# Patient Record
Sex: MALE | Race: WHITE | NOT HISPANIC OR LATINO | Employment: FULL TIME | URBAN - METROPOLITAN AREA
[De-identification: names, ages, dates, MRNs, and addresses within clinical notes are randomized per-mention and may not be internally consistent; named-entity substitution may affect disease eponyms.]

---

## 2017-09-13 ENCOUNTER — HOSPITAL ENCOUNTER (EMERGENCY)
Facility: HOSPITAL | Age: 37
Discharge: HOME | End: 2017-09-13
Attending: EMERGENCY MEDICINE | Admitting: EMERGENCY MEDICINE
Payer: COMMERCIAL

## 2017-09-13 ENCOUNTER — APPOINTMENT (EMERGENCY)
Dept: CT IMAGING | Facility: HOSPITAL | Age: 37
End: 2017-09-13
Payer: COMMERCIAL

## 2017-09-13 VITALS
TEMPERATURE: 97.9 F | OXYGEN SATURATION: 98 % | SYSTOLIC BLOOD PRESSURE: 109 MMHG | HEART RATE: 63 BPM | WEIGHT: 145 LBS | RESPIRATION RATE: 20 BRPM | DIASTOLIC BLOOD PRESSURE: 68 MMHG | HEIGHT: 67 IN | BODY MASS INDEX: 22.76 KG/M2

## 2017-09-13 DIAGNOSIS — R42 LIGHTHEADEDNESS: Primary | ICD-10-CM

## 2017-09-13 DIAGNOSIS — F43.0 STRESS REACTION: ICD-10-CM

## 2017-09-13 LAB
ALBUMIN SERPL BCP-MCNC: 3.6 G/DL (ref 3.5–5)
ALP SERPL-CCNC: 56 U/L (ref 46–116)
ALT SERPL W P-5'-P-CCNC: 23 U/L (ref 12–78)
ANION GAP SERPL CALCULATED.3IONS-SCNC: 7 MMOL/L (ref 4–13)
AST SERPL W P-5'-P-CCNC: 12 U/L (ref 5–45)
BASOPHILS # BLD AUTO: 0.03 THOUSANDS/ΜL (ref 0–0.1)
BASOPHILS NFR BLD AUTO: 0 % (ref 0–1)
BILIRUB SERPL-MCNC: 0.4 MG/DL (ref 0.2–1)
BUN SERPL-MCNC: 9 MG/DL (ref 5–25)
CALCIUM SERPL-MCNC: 9 MG/DL (ref 8.3–10.1)
CHLORIDE SERPL-SCNC: 103 MMOL/L (ref 100–108)
CO2 SERPL-SCNC: 30 MMOL/L (ref 21–32)
CREAT SERPL-MCNC: 1.25 MG/DL (ref 0.6–1.3)
EOSINOPHIL # BLD AUTO: 0.09 THOUSAND/ΜL (ref 0–0.61)
EOSINOPHIL NFR BLD AUTO: 1 % (ref 0–6)
ERYTHROCYTE [DISTWIDTH] IN BLOOD BY AUTOMATED COUNT: 12.1 % (ref 11.6–15.1)
GFR SERPL CREATININE-BSD FRML MDRD: 73 ML/MIN/1.73SQ M
GLUCOSE SERPL-MCNC: 86 MG/DL (ref 65–140)
HCT VFR BLD AUTO: 42.7 % (ref 36.5–49.3)
HGB BLD-MCNC: 14.3 G/DL (ref 12–17)
LYMPHOCYTES # BLD AUTO: 2.19 THOUSANDS/ΜL (ref 0.6–4.47)
LYMPHOCYTES NFR BLD AUTO: 29 % (ref 14–44)
MCH RBC QN AUTO: 30 PG (ref 26.8–34.3)
MCHC RBC AUTO-ENTMCNC: 33.5 G/DL (ref 31.4–37.4)
MCV RBC AUTO: 90 FL (ref 82–98)
MONOCYTES # BLD AUTO: 1.15 THOUSAND/ΜL (ref 0.17–1.22)
MONOCYTES NFR BLD AUTO: 15 % (ref 4–12)
NEUTROPHILS # BLD AUTO: 4.12 THOUSANDS/ΜL (ref 1.85–7.62)
NEUTS SEG NFR BLD AUTO: 54 % (ref 43–75)
NRBC BLD AUTO-RTO: 0 /100 WBCS
PLATELET # BLD AUTO: 220 THOUSANDS/UL (ref 149–390)
PMV BLD AUTO: 9.5 FL (ref 8.9–12.7)
POTASSIUM SERPL-SCNC: 3.7 MMOL/L (ref 3.5–5.3)
PROT SERPL-MCNC: 6.9 G/DL (ref 6.4–8.2)
RBC # BLD AUTO: 4.76 MILLION/UL (ref 3.88–5.62)
SODIUM SERPL-SCNC: 140 MMOL/L (ref 136–145)
TROPONIN I SERPL-MCNC: <0.02 NG/ML
WBC # BLD AUTO: 7.6 THOUSAND/UL (ref 4.31–10.16)

## 2017-09-13 PROCEDURE — 70450 CT HEAD/BRAIN W/O DYE: CPT

## 2017-09-13 PROCEDURE — 93005 ELECTROCARDIOGRAM TRACING: CPT | Performed by: PHYSICIAN ASSISTANT

## 2017-09-13 PROCEDURE — 80053 COMPREHEN METABOLIC PANEL: CPT | Performed by: PHYSICIAN ASSISTANT

## 2017-09-13 PROCEDURE — 85025 COMPLETE CBC W/AUTO DIFF WBC: CPT | Performed by: PHYSICIAN ASSISTANT

## 2017-09-13 PROCEDURE — 84484 ASSAY OF TROPONIN QUANT: CPT | Performed by: PHYSICIAN ASSISTANT

## 2017-09-13 PROCEDURE — 99284 EMERGENCY DEPT VISIT MOD MDM: CPT

## 2017-09-13 PROCEDURE — 96360 HYDRATION IV INFUSION INIT: CPT

## 2017-09-13 PROCEDURE — 36415 COLL VENOUS BLD VENIPUNCTURE: CPT | Performed by: PHYSICIAN ASSISTANT

## 2017-09-13 RX ADMIN — SODIUM CHLORIDE 1000 ML: 0.9 INJECTION, SOLUTION INTRAVENOUS at 18:53

## 2017-09-14 LAB
ATRIAL RATE: 71 BPM
P AXIS: 65 DEGREES
PR INTERVAL: 144 MS
QRS AXIS: 75 DEGREES
QRSD INTERVAL: 90 MS
QT INTERVAL: 388 MS
QTC INTERVAL: 421 MS
T WAVE AXIS: 32 DEGREES
VENTRICULAR RATE: 71 BPM

## 2018-08-05 ENCOUNTER — HOSPITAL ENCOUNTER (EMERGENCY)
Facility: HOSPITAL | Age: 38
Discharge: HOME | End: 2018-08-05
Attending: EMERGENCY MEDICINE
Payer: COMMERCIAL

## 2018-08-05 ENCOUNTER — APPOINTMENT (EMERGENCY)
Dept: RADIOLOGY | Facility: HOSPITAL | Age: 38
End: 2018-08-05
Payer: COMMERCIAL

## 2018-08-05 VITALS
RESPIRATION RATE: 16 BRPM | OXYGEN SATURATION: 96 % | HEART RATE: 80 BPM | TEMPERATURE: 98.6 F | SYSTOLIC BLOOD PRESSURE: 136 MMHG | DIASTOLIC BLOOD PRESSURE: 74 MMHG

## 2018-08-05 DIAGNOSIS — T14.8XXA FOREIGN BODY IN SKIN: Primary | ICD-10-CM

## 2018-08-05 PROCEDURE — 99283 EMERGENCY DEPT VISIT LOW MDM: CPT

## 2018-08-05 PROCEDURE — 73620 X-RAY EXAM OF FOOT: CPT

## 2018-08-05 RX ORDER — LIDOCAINE HYDROCHLORIDE 10 MG/ML
5 INJECTION, SOLUTION EPIDURAL; INFILTRATION; INTRACAUDAL; PERINEURAL ONCE
Status: COMPLETED | OUTPATIENT
Start: 2018-08-05 | End: 2018-08-05

## 2018-08-05 RX ADMIN — LIDOCAINE HYDROCHLORIDE 5 ML: 10 INJECTION, SOLUTION EPIDURAL; INFILTRATION; INTRACAUDAL; PERINEURAL at 18:13

## 2018-08-05 NOTE — ED PROVIDER NOTES
History  Chief Complaint   Patient presents with    Foreign Body in Skin     pt woth a piece of glass in his R foot, unsire since judit possible Thursday or friday, + pain      HPI  58-year-old male with psych history, no significant medical history presents to the emergency department with right foot pain  Patient states that just over a week ago, stepped on a piece of broken glass and got a small piece of glass stuck in his right foot  He tried soaking his foot and tried to pick at the glass, but has been unable to  He presents with persistent pain with weight-bearing, though he has no pain when off his foot  He is up-to-date on tetanus  None       Past Medical History:   Diagnosis Date    Psychiatric disorder     Schizoaffective disorder ([facility] [address] )        History reviewed  No pertinent surgical history  History reviewed  No pertinent family history  I have reviewed and agree with the history as documented  Social History   Substance Use Topics    Smoking status: Current Every Day Smoker     Types: Cigarettes    Smokeless tobacco: Never Used    Alcohol use No        Review of Systems   Constitutional: Negative for chills and fever  Respiratory: Negative for shortness of breath  Gastrointestinal: Negative for abdominal pain, nausea and vomiting  Musculoskeletal: Negative for arthralgias and joint swelling  Skin: Positive for wound  Negative for rash  Allergic/Immunologic: Negative for immunocompromised state  Psychiatric/Behavioral: The patient is not nervous/anxious  All other systems reviewed and are negative  Physical Exam  Physical Exam   Constitutional: He is oriented to person, place, and time  He appears well-nourished  No distress  HENT:   Head: Normocephalic and atraumatic  Eyes: EOM are normal    Neck: Normal range of motion  Neck supple  Cardiovascular: Normal rate and regular rhythm      Pulmonary/Chest: Effort normal and breath sounds normal  No respiratory distress  Abdominal: Soft  He exhibits no distension  There is no tenderness  Musculoskeletal: Normal range of motion  Feet:    Neurological: He is alert and oriented to person, place, and time  Skin: Skin is warm and dry  He is not diaphoretic  Psychiatric: He has a normal mood and affect  His behavior is normal    Nursing note and vitals reviewed  Vital Signs  ED Triage Vitals   Temperature Pulse Respirations Blood Pressure SpO2   08/05/18 1659 08/05/18 1658 08/05/18 1658 08/05/18 1658 08/05/18 1658   98 6 °F (37 °C) 80 16 136/74 96 %      Temp Source Heart Rate Source Patient Position - Orthostatic VS BP Location FiO2 (%)   08/05/18 1659 08/05/18 1658 08/05/18 1658 08/05/18 1658 --   Oral Monitor Sitting Right arm       Pain Score       08/05/18 1658       4           Vitals:    08/05/18 1658   BP: 136/74   Pulse: 80   Patient Position - Orthostatic VS: Sitting       Visual Acuity      ED Medications  Medications   lidocaine (PF) (XYLOCAINE-MPF) 1 % injection 5 mL (5 mL Infiltration Given by Other 8/5/18 0161)       Diagnostic Studies  Results Reviewed     None                 XR foot 2 views RIGHT   ED Interpretation by Renny Lindo MD (08/05 2364)   No obvious retained foreign body  Final Result by Alycia Lopez MD (08/06 6513)      No radiopaque foreign body  This report is in agreement with the preliminary interpretation           Workstation performed: [id number]                    Procedures  Foreign Body - Embedded  Date/Time: 8/5/2018 5:14 PM  Performed by: Anastasia Stockton by: Joao De La O     Patient location:  ED and bedside  Other Assisting Provider: No    Consent:     Consent obtained:  Verbal  Universal protocol:     Procedure explained and questions answered to patient or proxy's satisfaction: yes      Immediately prior to procedure, a time out was called: yes      Patient identity confirmed:  Verbally with patient  Location:     Location:  Foot Foot location:  R sole    Depth: Intradermal    Tendon involvement:  None  Pre-procedure details:     Imaging:  None    Neurovascular status: intact      Preparation: Patient was prepped and draped in usual sterile fashion    Anesthesia (see MAR for exact dosages): Anesthesia method:  Local infiltration    Local anesthetic:  Lidocaine 1% w/o epi  Procedure details:     Scalpel size:  11    Incision length:  3 mm    Localization method:  Visualized    Bloodless field: yes      Removal mechanism: Forceps    Removal Method:  Open    Procedure complexity:  Simple    Foreign bodies recovered:  1    Description:  3 mm x 1 mm sharp glass shard  Post-procedure details:     Neurovascular status: intact      Confirmation:  No additional foreign bodies on visualization    Skin closure:  None    Dressing:  Bulky dressing    Patient tolerance of procedure: Tolerated well, no immediate complications           Phone Contacts  ED Phone Contact    ED Course                               MDM  Number of Diagnoses or Management Options  Foreign body in skin:   Diagnosis management comments: 67-year-old male with glass in foot, removed at bedside  XR following removal reveals no obvious retained foreign body  Will discharge with instructions to soak, return for signs of infection  CritCare Time    Disposition  Final diagnoses:   Foreign body in skin     Time reflects when diagnosis was documented in both MDM as applicable and the Disposition within this note     Time User Action Codes Description Comment    8/5/2018  5:36 PM Samara Kessler  8XXA] Foreign body in skin       ED Disposition     ED Disposition Condition Comment    Discharge  Joelle Covarrubias discharge to home/self care      Condition at discharge: Good        Follow-up Information     Follow up With Specialties Details Why Contact Info    Yadira Mace MD Internal Medicine  As needed, If symptoms worsen [address] [place] [id number]  [phone]            There are no discharge medications for this patient  No discharge procedures on file      ED Provider  Electronically Signed by           Vaughn Yi MD  08/07/18 2811

## 2018-08-05 NOTE — DISCHARGE INSTRUCTIONS
Soft Tissue Foreign Body   WHAT YOU NEED TO KNOW:   A foreign body may dissolve or come out of your skin without treatment  It may take weeks or months for this to happen  Your skin may feel stretched and sore after the foreign body is removed  This is normal and should get better within a few days  DISCHARGE INSTRUCTIONS:   Return to the emergency department if:   · Blood soaks through your bandage  · Your stitches come apart  · You see red streaks on the skin near your wound  · You have bleeding that does not stop after 10 minutes of holding firm, direct pressure over the wound  Contact your healthcare provider if:   · You have a fever  · Your wound is red, swollen, and draining pus  · Your symptoms, such as pain, do not get better or get worse  · You have questions or concerns about your condition or care  Medicines: You may  need any of the following:  · Antibiotics  help prevent a bacterial infection  · Acetaminophen  decreases pain and fever  It is available without a doctor's order  Ask how much to take and how often to take it  Follow directions  Acetaminophen can cause liver damage if not taken correctly  · NSAIDs , such as ibuprofen, help decrease swelling, pain, and fever  This medicine is available with or without a doctor's order  NSAIDs can cause stomach bleeding or kidney problems in certain people  If you take blood thinner medicine, always ask your healthcare provider if NSAIDs are safe for you  Always read the medicine label and follow directions  · Prescription pain medicine  may be given  Ask your healthcare provider how to take this medicine safely  Some prescription pain medicines contain acetaminophen  Do not take other medicines that contain acetaminophen without talking to your healthcare provider  Too much acetaminophen may cause liver damage  Prescription pain medicine may cause constipation   Ask your healthcare provider how to prevent or treat constipation  · Take your medicine as directed  Contact your healthcare provider if you think your medicine is not helping or if you have side effects  Tell him of her if you are allergic to any medicine  Keep a list of the medicines, vitamins, and herbs you take  Include the amounts, and when and why you take them  Bring the list or the pill bottles to follow-up visits  Carry your medicine list with you in case of an emergency  Elevate  the injured area above the level of your heart as often as you can  This will help decrease swelling and pain  Prop the injured area on pillows or blankets to keep it elevated comfortably  Apply ice  on your wound for 15 to 20 minutes every hour or as directed  Use an ice pack, or put crushed ice in a plastic bag  Cover it with a towel before you apply it to your skin  Ice helps prevent tissue damage and decreases swelling and pain  Care for your wound as directed: Your skin may feel stretched and sore after the foreign body is removed  This is normal and should get better within a few days  Keep your wound clean and dry  Do not get your wound wet  Do not change the bandage for 48 hours or as directed  You can change your bandages before 48 hours if they get wet or dirty  If your wound is packed, remove and change the packing as directed  Cover the area with a bandage as directed  Apply firm, steady pressure for 5 to 10 minutes if your wound bleeds  Use a clean gauze or towel to apply pressure  Bathing:  Ask your healthcare provider when your wound can get wet  When he says it is okay, carefully wash around the wound with soap and water  Let soap and water run over your wound  Do not scrub your wound  Dry the area and put on new, clean bandages as directed  Follow up with your healthcare provider as directed: You may need to return in 48 hours to have your wound checked for infection   You may need x-ray, ultrasound, or CT pictures to make sure all of the foreign body has been removed  Write down your questions so you remember to ask them during your visits  © 2017 [employer] Information is for End User's use only and may not be sold, redistributed or otherwise used for commercial purposes  All illustrations and images included in CareNotes® are the copyrighted property of [employer]  or [employer]  The above information is an  only  It is not intended as medical advice for individual conditions or treatments  Talk to your doctor, nurse or pharmacist before following any medical regimen to see if it is safe and effective for you

## 2019-01-13 ENCOUNTER — HOSPITAL ENCOUNTER (EMERGENCY)
Facility: HOSPITAL | Age: 39
Discharge: HOME | End: 2019-01-13
Attending: EMERGENCY MEDICINE | Admitting: EMERGENCY MEDICINE

## 2019-01-13 ENCOUNTER — APPOINTMENT (EMERGENCY)
Dept: ULTRASOUND IMAGING | Facility: HOSPITAL | Age: 39
End: 2019-01-13

## 2019-01-13 ENCOUNTER — APPOINTMENT (EMERGENCY)
Dept: CT IMAGING | Facility: HOSPITAL | Age: 39
End: 2019-01-13

## 2019-01-13 VITALS
DIASTOLIC BLOOD PRESSURE: 59 MMHG | HEIGHT: 67 IN | OXYGEN SATURATION: 97 % | SYSTOLIC BLOOD PRESSURE: 104 MMHG | HEART RATE: 76 BPM | RESPIRATION RATE: 16 BRPM | BODY MASS INDEX: 25.19 KG/M2 | TEMPERATURE: 97.7 F | WEIGHT: 160.5 LBS

## 2019-01-13 DIAGNOSIS — K64.4 EXTERNAL HEMORRHOID: Primary | ICD-10-CM

## 2019-01-13 DIAGNOSIS — K40.90 RIGHT INGUINAL HERNIA: ICD-10-CM

## 2019-01-13 DIAGNOSIS — K42.9 UMBILICAL HERNIA: ICD-10-CM

## 2019-01-13 LAB
ALBUMIN SERPL BCP-MCNC: 3.5 G/DL (ref 3.5–5)
ALP SERPL-CCNC: 83 U/L (ref 46–116)
ALT SERPL W P-5'-P-CCNC: 19 U/L (ref 12–78)
ANION GAP SERPL CALCULATED.3IONS-SCNC: 8 MMOL/L (ref 4–13)
AST SERPL W P-5'-P-CCNC: 15 U/L (ref 5–45)
BACTERIA UR QL AUTO: ABNORMAL /HPF
BASOPHILS # BLD AUTO: 0.04 THOUSANDS/ΜL (ref 0–0.1)
BASOPHILS NFR BLD AUTO: 0 % (ref 0–1)
BILIRUB DIRECT SERPL-MCNC: 0.12 MG/DL (ref 0–0.2)
BILIRUB SERPL-MCNC: 0.4 MG/DL (ref 0.2–1)
BILIRUB UR QL STRIP: NEGATIVE
BUN SERPL-MCNC: 22 MG/DL (ref 5–25)
CALCIUM SERPL-MCNC: 8.2 MG/DL (ref 8.3–10.1)
CHLORIDE SERPL-SCNC: 104 MMOL/L (ref 100–108)
CLARITY UR: CLEAR
CO2 SERPL-SCNC: 29 MMOL/L (ref 21–32)
COLOR UR: YELLOW
CREAT SERPL-MCNC: 1.27 MG/DL (ref 0.6–1.3)
EOSINOPHIL # BLD AUTO: 0.08 THOUSAND/ΜL (ref 0–0.61)
EOSINOPHIL NFR BLD AUTO: 1 % (ref 0–6)
ERYTHROCYTE [DISTWIDTH] IN BLOOD BY AUTOMATED COUNT: 13.2 % (ref 11.6–15.1)
GFR SERPL CREATININE-BSD FRML MDRD: 71 ML/MIN/1.73SQ M
GLUCOSE SERPL-MCNC: 102 MG/DL (ref 65–140)
GLUCOSE UR STRIP-MCNC: NEGATIVE MG/DL
HCT VFR BLD AUTO: 43.1 % (ref 36.5–49.3)
HGB BLD-MCNC: 14.3 G/DL (ref 12–17)
HGB UR QL STRIP.AUTO: NEGATIVE
IMM GRANULOCYTES # BLD AUTO: 0.04 THOUSAND/UL (ref 0–0.2)
IMM GRANULOCYTES NFR BLD AUTO: 0 % (ref 0–2)
KETONES UR STRIP-MCNC: NEGATIVE MG/DL
LEUKOCYTE ESTERASE UR QL STRIP: NEGATIVE
LYMPHOCYTES # BLD AUTO: 1.97 THOUSANDS/ΜL (ref 0.6–4.47)
LYMPHOCYTES NFR BLD AUTO: 16 % (ref 14–44)
MCH RBC QN AUTO: 30.3 PG (ref 26.8–34.3)
MCHC RBC AUTO-ENTMCNC: 33.2 G/DL (ref 31.4–37.4)
MCV RBC AUTO: 91 FL (ref 82–98)
MONOCYTES # BLD AUTO: 1.7 THOUSAND/ΜL (ref 0.17–1.22)
MONOCYTES NFR BLD AUTO: 13 % (ref 4–12)
NEUTROPHILS # BLD AUTO: 8.83 THOUSANDS/ΜL (ref 1.85–7.62)
NEUTS SEG NFR BLD AUTO: 70 % (ref 43–75)
NITRITE UR QL STRIP: NEGATIVE
NON-SQ EPI CELLS URNS QL MICRO: ABNORMAL /HPF
NRBC BLD AUTO-RTO: 0 /100 WBCS
PH UR STRIP.AUTO: 6.5 [PH] (ref 4.5–8)
PLATELET # BLD AUTO: 257 THOUSANDS/UL (ref 149–390)
PMV BLD AUTO: 9.9 FL (ref 8.9–12.7)
POTASSIUM SERPL-SCNC: 4 MMOL/L (ref 3.5–5.3)
PROT SERPL-MCNC: 6.8 G/DL (ref 6.4–8.2)
PROT UR STRIP-MCNC: ABNORMAL MG/DL
RBC # BLD AUTO: 4.72 MILLION/UL (ref 3.88–5.62)
RBC #/AREA URNS AUTO: ABNORMAL /HPF
SODIUM SERPL-SCNC: 141 MMOL/L (ref 136–145)
SP GR UR STRIP.AUTO: 1.02 (ref 1–1.03)
UROBILINOGEN UR QL STRIP.AUTO: 0.2 E.U./DL
WBC # BLD AUTO: 12.66 THOUSAND/UL (ref 4.31–10.16)
WBC #/AREA URNS AUTO: ABNORMAL /HPF

## 2019-01-13 PROCEDURE — 80076 HEPATIC FUNCTION PANEL: CPT | Performed by: PHYSICIAN ASSISTANT

## 2019-01-13 PROCEDURE — 85025 COMPLETE CBC W/AUTO DIFF WBC: CPT | Performed by: PHYSICIAN ASSISTANT

## 2019-01-13 PROCEDURE — 96360 HYDRATION IV INFUSION INIT: CPT

## 2019-01-13 PROCEDURE — 81001 URINALYSIS AUTO W/SCOPE: CPT | Performed by: PHYSICIAN ASSISTANT

## 2019-01-13 PROCEDURE — 74177 CT ABD & PELVIS W/CONTRAST: CPT

## 2019-01-13 PROCEDURE — 99284 EMERGENCY DEPT VISIT MOD MDM: CPT

## 2019-01-13 PROCEDURE — 76870 US EXAM SCROTUM: CPT

## 2019-01-13 PROCEDURE — 36415 COLL VENOUS BLD VENIPUNCTURE: CPT | Performed by: PHYSICIAN ASSISTANT

## 2019-01-13 PROCEDURE — 80048 BASIC METABOLIC PNL TOTAL CA: CPT | Performed by: PHYSICIAN ASSISTANT

## 2019-01-13 RX ADMIN — SODIUM CHLORIDE 1000 ML: 0.9 INJECTION, SOLUTION INTRAVENOUS at 17:09

## 2019-01-13 RX ADMIN — IOHEXOL 100 ML: 350 INJECTION, SOLUTION INTRAVENOUS at 19:15

## 2019-01-13 NOTE — ED PROVIDER NOTES
History  Chief Complaint   Patient presents with    Testicle Pain     Patient c/o testicle pain along with anal prolapse and lower back pain     44 y/o male patient presents to the ER for evaluation of multiple complaints  First he complains of "something protruding from his rectum"  This is ongoing for months  Now causing increased pain and discomfort over 1 week  Describes it as a pressure  This pressure does radiate to his right testicle  No swelling, redness, or pain directly in testicle  Has never had this before  Also notes swelling around umbilicus/belly button over 1 week  Pain is constant but waxes and wanes in severity  Denies fever, chills, chest pain, sob, n/v, diarrhea, dysuria  History provided by:  Patient   used: No    Testicle Pain   Location:  Right testicle  Quality:  Pressure  Severity:  Mild  Onset quality:  Gradual  Duration:  1 week  Timing:  Constant  Progression:  Waxing and waning  Chronicity:  New  Context:  Started with rectal pain, now with pressure throughout abdomen into right testicle  Relieved by:  Nothing  Worsened by:  Nonthing  Ineffective treatments:  None tried  Associated symptoms: abdominal pain    Associated symptoms: no chest pain, no congestion, no cough, no diarrhea, no ear pain, no fatigue, no fever, no headaches, no loss of consciousness, no myalgias, no nausea, no rash, no rhinorrhea, no shortness of breath, no sore throat, no vomiting and no wheezing        None       Past Medical History:   Diagnosis Date    Psychiatric disorder     Schizoaffective disorder ([facility] [address] )        Past Surgical History:   Procedure Laterality Date    ANUS SURGERY         History reviewed  No pertinent family history  I have reviewed and agree with the history as documented      Social History   Substance Use Topics    Smoking status: Current Every Day Smoker     Types: Cigarettes    Smokeless tobacco: Current User    Alcohol use No        Review of Systems Constitutional: Negative for activity change, appetite change, chills, diaphoresis, fatigue, fever and unexpected weight change  HENT: Negative for congestion, ear pain, rhinorrhea, sinus pressure, sore throat and trouble swallowing  Eyes: Negative for photophobia and visual disturbance  Respiratory: Negative for apnea, cough, choking, chest tightness, shortness of breath, wheezing and stridor  Cardiovascular: Negative for chest pain, palpitations and leg swelling  Gastrointestinal: Positive for abdominal pain and rectal pain  Negative for abdominal distention, anal bleeding, blood in stool, constipation, diarrhea, nausea and vomiting  Genitourinary: Positive for testicular pain  Negative for decreased urine volume, difficulty urinating, dysuria, enuresis, flank pain, frequency, hematuria and urgency  Musculoskeletal: Negative for arthralgias, myalgias, neck pain and neck stiffness  Skin: Negative for color change, pallor, rash and wound  Allergic/Immunologic: Negative  Neurological: Negative for dizziness, tremors, loss of consciousness, syncope, weakness, light-headedness, numbness and headaches  Hematological: Negative  Psychiatric/Behavioral: Negative  All other systems reviewed and are negative  Physical Exam  Physical Exam   Constitutional: He is oriented to person, place, and time  He appears well-developed and well-nourished  Non-toxic appearance  He does not have a sickly appearance  He does not appear ill  No distress  HENT:   Head: Normocephalic and atraumatic  Eyes: Pupils are equal, round, and reactive to light  EOM and lids are normal    Neck: Normal range of motion  Neck supple  Cardiovascular: Normal rate, regular rhythm, S1 normal, S2 normal, normal heart sounds, intact distal pulses and normal pulses  Exam reveals no gallop, no distant heart sounds, no friction rub and no decreased pulses  No murmur heard    Pulses:       Radial pulses are 2+ on the right side, and 2+ on the left side  Pulmonary/Chest: Effort normal and breath sounds normal  No accessory muscle usage  No apnea, no tachypnea and no bradypnea  No respiratory distress  He has no decreased breath sounds  He has no wheezes  He has no rhonchi  He has no rales  Abdominal: Soft  Normal appearance  He exhibits no distension  There is no tenderness  There is no rigidity, no rebound and no guarding  Genitourinary: Testes normal and penis normal  Rectal exam shows external hemorrhoid and tenderness  Rectal exam shows no mass  Cremasteric reflex is present  Right testis shows no mass, no swelling and no tenderness  Right testis is descended  Cremasteric reflex is not absent on the right side  Left testis shows no mass, no swelling and no tenderness  Left testis is descended  Cremasteric reflex is not absent on the left side  Musculoskeletal: Normal range of motion  He exhibits no edema, tenderness or deformity  Neurological: He is alert and oriented to person, place, and time  No cranial nerve deficit  GCS eye subscore is 4  GCS verbal subscore is 5  GCS motor subscore is 6  GCS 15  AAOx3  Ambulating in department without difficulty  CN II-XII grossly intact  No focal neuro deficits  Skin: Skin is warm, dry and intact  No rash noted  He is not diaphoretic  No erythema  No pallor  Psychiatric: His speech is normal    Nursing note and vitals reviewed        Vital Signs  ED Triage Vitals   Temperature Pulse Respirations Blood Pressure SpO2   01/13/19 1610 01/13/19 1610 01/13/19 1610 01/13/19 1610 01/13/19 1610   97 7 °F (36 5 °C) 84 20 120/62 97 %      Temp Source Heart Rate Source Patient Position - Orthostatic VS BP Location FiO2 (%)   01/13/19 1610 01/13/19 1610 01/13/19 1610 01/13/19 1610 --   Oral Monitor Lying Right arm       Pain Score       01/13/19 1819       No Pain           Vitals:    01/13/19 1610 01/13/19 1819   BP: 120/62 104/59   Pulse: 84 76   Patient Position - Orthostatic VS: Lying Lying       Visual Acuity      ED Medications  Medications   sodium chloride 0 9 % bolus 1,000 mL (0 mL Intravenous Stopped 1/13/19 1809)   iohexol (OMNIPAQUE) 350 MG/ML injection (SINGLE-DOSE) 100 mL (100 mL Intravenous Given 1/13/19 1915)       Diagnostic Studies  Results Reviewed     Procedure Component Value Units Date/Time    Hepatic function panel [[id number]]  (Normal) Collected:  01/13/19 1705    Lab Status:  Final result Specimen:  Blood from Arm, Right Updated:  01/13/19 1733     Total Bilirubin 0 40 mg/dL      Bilirubin, Direct 0 12 mg/dL      Alkaline Phosphatase 83 U/L      AST 15 U/L      ALT 19 U/L      Total Protein 6 8 g/dL      Albumin 3 5 g/dL     Basic metabolic panel [[id number]]  (Abnormal) Collected:  01/13/19 1705    Lab Status:  Final result Specimen:  Blood from Arm, Right Updated:  01/13/19 1732     Sodium 141 mmol/L      Potassium 4 0 mmol/L      Chloride 104 mmol/L      CO2 29 mmol/L      ANION GAP 8 mmol/L      BUN 22 mg/dL      Creatinine 1 27 mg/dL      Glucose 102 mg/dL      Calcium 8 2 (L) mg/dL      eGFR 71 ml/min/1 73sq m     Narrative:         National Kidney Disease Education Program recommendations are as follows:  GFR calculation is accurate only with a steady state creatinine  Chronic Kidney disease less than 60 ml/min/1 73 sq  meters  Kidney failure less than 15 ml/min/1 73 sq  meters      Urine Microscopic [[id number]]  (Abnormal) Collected:  01/13/19 1709    Lab Status:  Final result Specimen:  Urine from Urine, Clean Catch Updated:  01/13/19 1728     RBC, UA 0-1 (A) /hpf      WBC, UA 1-2 (A) /hpf      Epithelial Cells Occasional /hpf      Bacteria, UA Occasional /hpf     UA w Reflex to Microscopic w Reflex to Culture [[id number]]  (Abnormal) Collected:  01/13/19 1709    Lab Status:  Final result Specimen:  Urine from Urine, Clean Catch Updated:  01/13/19 1720     Color, UA Yellow     Clarity, UA Clear     Specific Gravity, UA 1 025     pH, UA 6 5 Leukocytes, UA Negative     Nitrite, UA Negative     Protein, UA Trace (A) mg/dl      Glucose, UA Negative mg/dl      Ketones, UA Negative mg/dl      Urobilinogen, UA 0 2 E U /dl      Bilirubin, UA Negative     Blood, UA Negative    CBC and differential [[id number]]  (Abnormal) Collected:  01/13/19 1705    Lab Status:  Final result Specimen:  Blood from Arm, Right Updated:  01/13/19 1712     WBC 12 66 (H) Thousand/uL      RBC 4 72 Million/uL      Hemoglobin 14 3 g/dL      Hematocrit 43 1 %      MCV 91 fL      MCH 30 3 pg      MCHC 33 2 g/dL      RDW 13 2 %      MPV 9 9 fL      Platelets 426 Thousands/uL      nRBC 0 /100 WBCs      Neutrophils Relative 70 %      Immat GRANS % 0 %      Lymphocytes Relative 16 %      Monocytes Relative 13 (H) %      Eosinophils Relative 1 %      Basophils Relative 0 %      Neutrophils Absolute 8 83 (H) Thousands/µL      Immature Grans Absolute 0 04 Thousand/uL      Lymphocytes Absolute 1 97 Thousands/µL      Monocytes Absolute 1 70 (H) Thousand/µL      Eosinophils Absolute 0 08 Thousand/µL      Basophils Absolute 0 04 Thousands/µL                  CT abdomen pelvis with contrast   Final Result by Georgette Edwards MD (01/13 1929)      No acute intra-abdominal abnormality  No free air or free fluid  Small right-sided inguinal hernia containing a small portion of the right anterior urinary bladder wall  Normal appendix visualized              Workstation performed: [id number]         US scrotum and testicles   Final Result by Ngoc Andre MD (01/13 1702)       Normal        Workstation performed: [id number]                    Procedures  Procedures       Phone Contacts  ED Phone Contact    ED Course                               MDM  Number of Diagnoses or Management Options  External hemorrhoid: new and requires workup  Right inguinal hernia: new and requires workup  Umbilical hernia: new and requires workup  Diagnosis management comments: DDx including but not limited to: epididymitis, orchitis, testicular torsion, tumor, varicocele, hydrocele, hernia, STD, abscess, cellulitis, kidney stone, UTI, hemorrhoid, abscess  Plan: CT  US testicle  Labs  dispo pending  Amount and/or Complexity of Data Reviewed  Clinical lab tests: ordered and reviewed  Tests in the radiology section of CPT®: ordered and reviewed  Independent visualization of images, tracings, or specimens: yes    Risk of Complications, Morbidity, and/or Mortality  Presenting problems: moderate  Management options: low  General comments: 50-year-old male here for evaluation of lump around rectum as well as abdominal pressure  CT shows inguinal hernia  On exam he also has umbilical hernia  No evidence of obstruction or strangulation  He has no pain at this time  Lump along rectum appears to be an external hemorrhoid  Recommended any saw for this  Recommended follow up with general surgeon for evaluation of hernia  In regards to his reported impotence, I recommend he follow up with his family doctor and may ultimately need to see Urology  Return parameters provided  Pt understands and agrees with plan  Patient Progress  Patient progress: stable    CritCare Time    Disposition  Final diagnoses:   External hemorrhoid   Right inguinal hernia   Umbilical hernia     Time reflects when diagnosis was documented in both MDM as applicable and the Disposition within this note     Time User Action Codes Description Comment    1/13/2019  7:57 PM Rajan Chiu Add [K64 4] External hemorrhoid     1/13/2019  7:57 PM Berna ROLON Add [K40 90] Right inguinal hernia     1/13/2019  7:57 PM Rajan Chiu Add [V93 0] Umbilical hernia       ED Disposition     ED Disposition Condition Comment    Discharge  Danii Back discharge to home/self care      Condition at discharge: Good        Follow-up Information     Follow up With Specialties Details Why Armond Couch MD Internal Medicine   [address] [place] [address]      Low Aldana MD General Surgery Call for your hernia [address]  [address] [phone]            Patient's Medications   Discharge Prescriptions    HYDROCORTISONE (ANUSOL-HC) 2 5 % RECTAL CREAM    Insert 1 application into the rectum 2 (two) times a day       Start Date: 1/13/2019 End Date: --       Order Dose: 1 application       Quantity: 30 g    Refills: 0     No discharge procedures on file      ED Provider  Electronically Signed by           Anam Boone PA-C  01/13/19 1954

## 2019-01-14 NOTE — DISCHARGE INSTRUCTIONS
Return to the Emergency Department sooner if increased pain, fever, vomiting, diarrhea, difficulty breathing or urinating, bleeding  Clear fluids for 1-2 days and then advance diet as tolerated  Hemorrhoids   WHAT YOU NEED TO KNOW:   Hemorrhoids are swollen blood vessels inside your rectum (internal hemorrhoids) or on your anus (external hemorrhoids)  Sometimes a hemorrhoid may prolapse  This means it extends out of your anus  DISCHARGE INSTRUCTIONS:   Return to the emergency department if:   · You have severe pain in your rectum or around your anus  · You have severe pain in your abdomen and you are vomiting  · You have bleeding from your anus that soaks through your underwear  Contact your healthcare provider if:   · You have frequent and painful bowel movements  · Your hemorrhoid looks or feels more swollen than usual      · You do not have a bowel movement for 2 days or more  · You see or feel tissue coming through your anus  · You have questions or concerns about your condition or care  Medicines: You may  need any of the following:  · A pad, cream, or ointment  can help decrease pain, swelling, and itching  · Stool softeners  help treat or prevent constipation  · NSAIDs , such as ibuprofen, help decrease swelling, pain, and fever  NSAIDs can cause stomach bleeding or kidney problems in certain people  If you take blood thinner medicine, always ask your healthcare provider if NSAIDs are safe for you  Always read the medicine label and follow directions  · Take your medicine as directed  Contact your healthcare provider if you think your medicine is not helping or if you have side effects  Tell him or her if you are allergic to any medicine  Keep a list of the medicines, vitamins, and herbs you take  Include the amounts, and when and why you take them  Bring the list or the pill bottles to follow-up visits   Carry your medicine list with you in case of an emergency  Manage your symptoms:   · Apply ice on your anus for 15 to 20 minutes every hour or as directed  Use an ice pack, or put crushed ice in a plastic bag  Cover it with a towel before you apply it to your anus  Ice helps prevent tissue damage and decreases swelling and pain  · Take a sitz bath  Fill a bathtub with 4 to 6 inches of warm water  You may also use a sitz bath pan that fits inside a toilet bowl  Sit in the sitz bath for 15 minutes  Do this 3 times a day, and after each bowel movement  The warm water can help decrease pain and swelling  · Keep your anal area clean  Gently wash the area with warm water daily  Soap may irritate the area  After a bowel movement, wipe with moist towelettes or wet toilet paper  Dry toilet paper can irritate the area  Prevent hemorrhoids:   · Do not strain to have a bowel movement  Do not sit on the toilet too long  These actions can increase pressure on the tissues in your rectum and anus  · Drink plenty of liquids  Liquids can help prevent constipation  Ask how much liquid to drink each day and which liquids are best for you  · Eat a variety of high-fiber foods  Examples include fruits, vegetables, and whole grains  Ask your healthcare provider how much fiber you need each day  You may need to take a fiber supplement  · Exercise as directed  Exercise, such as walking, may make it easier to have a bowel movement  Ask your healthcare provider to help you create an exercise plan  · Do not have anal sex  Anal sex can weaken the skin around your rectum and anus  · Avoid heavy lifting  This can cause straining and increase your risk for another hemorrhoid  Follow up with your healthcare provider as directed:  Write down your questions so you remember to ask them during your visits     © 2017 [employer] Information is for End User's use only and may not be sold, redistributed or otherwise used for commercial purposes  All illustrations and images included in CareNotes® are the copyrighted property of [employer]  or [employer]  The above information is an  only  It is not intended as medical advice for individual conditions or treatments  Talk to your doctor, nurse or pharmacist before following any medical regimen to see if it is safe and effective for you  Inguinal Hernia   WHAT YOU NEED TO KNOW:   An inguinal hernia happens when organs or abdominal tissue push through a weak spot in the abdominal wall  The abdominal wall is made of fat and muscle  It holds the intestines in place  The hernia may contain fluid, tissue from the abdomen, or part of an organ (such as an intestine)  DISCHARGE INSTRUCTIONS:   Return to the emergency department if:   · You have severe abdominal pain with nausea and vomiting  · Your abdomen is larger than usual      · Your hernia gets bigger or is purple or blue  · You see blood in your bowel movements  · You feel weak, dizzy, or faint  Contact your healthcare provider if:   · You have a fever  · You have questions or concerns about your condition or care  Medicine: You may  need the following:  · NSAIDs , such as ibuprofen, help decrease swelling, pain, and fever  NSAIDs can cause stomach bleeding or kidney problems in certain people  If you take blood thinner medicine, always ask your healthcare provider if NSAIDs are safe for you  Always read the medicine label and follow directions  · Take your medicine as directed  Contact your healthcare provider if you think your medicine is not helping or if you have side effects  Tell him or her if you are allergic to any medicine  Keep a list of the medicines, vitamins, and herbs you take  Include the amounts, and when and why you take them  Bring the list or the pill bottles to follow-up visits  Carry your medicine list with you in case of an emergency    Follow up with your healthcare provider as directed:  Write down your questions so you remember to ask them during your visits  Manage your symptoms and prevent another hernia:   · Do not lift anything heavy  Heavy lifting can make your hernia worse or cause another hernia  Ask your healthcare provider how much is safe for you to lift  · Drink liquids as directed  Liquids may prevent constipation and straining during a bowel movement  Ask how much liquid to drink each day and which liquids are best for you  · Eat foods high in fiber  Fiber may prevent constipation and straining during a bowel movement  Foods that contain fiber include fruits, vegetables, beans, lentils, and whole grains  · Maintain a healthy weight  If you are overweight, weight loss may prevent your hernia from getting worse  It may also prevent another hernia  Talk to your healthcare provider about exercise and how to lose weight safely if you are overweight  · Do not smoke  Nicotine and other chemicals in cigarettes and cigars can weaken the abdominal wall  This may increase your risk for another hernia  Ask your healthcare provider for information if you currently smoke and need help to quit  E-cigarettes or smokeless tobacco still contain nicotine  Talk to your healthcare provider before you use these products  © 2017 [address] [facility] Information is for End User's use only and may not be sold, redistributed or otherwise used for commercial purposes  All illustrations and images included in CareNotes® are the copyrighted property of [employer] Inc  or Dae Murillo  The above information is an  only  It is not intended as medical advice for individual conditions or treatments  Talk to your doctor, nurse or pharmacist before following any medical regimen to see if it is safe and effective for you

## 2019-01-31 ENCOUNTER — NURSE TRIAGE (OUTPATIENT)
Dept: PHYSICAL THERAPY | Facility: OTHER | Age: 39
End: 2019-01-31

## 2019-01-31 ENCOUNTER — HOSPITAL ENCOUNTER (EMERGENCY)
Facility: HOSPITAL | Age: 39
Discharge: HOME | End: 2019-01-31
Attending: EMERGENCY MEDICINE | Admitting: EMERGENCY MEDICINE

## 2019-01-31 VITALS
BODY MASS INDEX: 25.22 KG/M2 | HEART RATE: 85 BPM | DIASTOLIC BLOOD PRESSURE: 88 MMHG | RESPIRATION RATE: 16 BRPM | WEIGHT: 160.72 LBS | HEIGHT: 67 IN | OXYGEN SATURATION: 97 % | SYSTOLIC BLOOD PRESSURE: 151 MMHG | TEMPERATURE: 97.8 F

## 2019-01-31 DIAGNOSIS — M54.50 ACUTE LEFT-SIDED LOW BACK PAIN WITHOUT SCIATICA: Primary | ICD-10-CM

## 2019-01-31 PROCEDURE — 99283 EMERGENCY DEPT VISIT LOW MDM: CPT

## 2019-01-31 RX ORDER — NAPROXEN 250 MG/1
250 TABLET ORAL ONCE
Status: COMPLETED | OUTPATIENT
Start: 2019-01-31 | End: 2019-01-31

## 2019-01-31 RX ORDER — LIDOCAINE 50 MG/G
1 PATCH TOPICAL ONCE
Status: DISCONTINUED | OUTPATIENT
Start: 2019-01-31 | End: 2019-01-31 | Stop reason: HOSPADM

## 2019-01-31 RX ORDER — NAPROXEN 375 MG/1
375 TABLET ORAL 2 TIMES DAILY WITH MEALS
Qty: 20 TABLET | Refills: 0 | Status: SHIPPED | OUTPATIENT
Start: 2019-01-31

## 2019-01-31 RX ADMIN — LIDOCAINE 1 PATCH: 50 PATCH CUTANEOUS at 11:28

## 2019-01-31 RX ADMIN — NAPROXEN 250 MG: 250 TABLET ORAL at 11:27

## 2019-01-31 NOTE — ED NOTES
D/c reviewed with pt  Pt verbalized understanding and has no further questions at this time  Pt ambulatory off unit with steady gait       Lilia Cruz RN  01/31/19 9328

## 2019-01-31 NOTE — ED PROVIDER NOTES
Pt Name: Selena Munroe  MRN: [id number]  [place] 1980  Age/Sex: 43 y o  male  Date of evaluation: 1/31/2019  PCP: Aicha Josue MD    CHIEF COMPLAINT    Chief Complaint   Patient presents with    Back Pain     Pt reports lower back pain that bgean yest  Denies initial inury  HPI    43 y o  male presenting with low back pain  Patient states the pain began the day before yesterday when he jumped up to quickly at the symptoms left low back  Pain gradually worse was severe that day as well as yesterday but has gotten better since then  Pain is currently mild to moderate intensity, in the left low back, nonradiating, worse with movement and better with rest   Patient still able to walk without difficulty  He denies direct trauma, fever, numbness, weakness, changes in urine or bowel movements  HPI      Past Medical and Surgical History    Past Medical History:   Diagnosis Date    Psychiatric disorder     Schizoaffective disorder [facility])        Past Surgical History:   Procedure Laterality Date    ANUS SURGERY         History reviewed  No pertinent family history  Social History   Substance Use Topics    Smoking status: Current Every Day Smoker     Types: Cigarettes    Smokeless tobacco: Current User    Alcohol use No           Allergies    No Known Allergies    Home Medications    Prior to Admission medications    Medication Sig Start Date End Date Taking? Authorizing Provider   hydrocortisone (ANUSOL-HC) 2 5 % rectal cream Insert 1 application into the rectum 2 (two) times a day 1/13/19   Cachorro Abad PA-C           Review of Systems    Review of Systems   Constitutional: Negative for appetite change, chills and diaphoresis  HENT: Negative for drooling, facial swelling, trouble swallowing and voice change  Respiratory: Negative for apnea, shortness of breath and wheezing  Cardiovascular: Negative for chest pain and leg swelling     Gastrointestinal: Negative for abdominal distention, abdominal pain, diarrhea, nausea and vomiting  Genitourinary: Negative for dysuria and urgency  Musculoskeletal: Positive for back pain  Negative for arthralgias, gait problem and neck pain  Skin: Negative for color change, rash and wound  Neurological: Negative for seizures, speech difficulty, weakness and headaches  Psychiatric/Behavioral: Negative for agitation, behavioral problems and dysphoric mood  The patient is not nervous/anxious  All other systems reviewed and negative  Physical Exam      ED Triage Vitals [01/31/19 1048]   Temperature Pulse Respirations Blood Pressure SpO2   97 8 °F (36 6 °C) 85 16 151/88 97 %      Temp Source Heart Rate Source Patient Position - Orthostatic VS BP Location FiO2 (%)   Oral Monitor Lying Right arm --      Pain Score       Worst Possible Pain               Physical Exam   Constitutional: He is oriented to person, place, and time  He appears well-developed and well-nourished  HENT:   Head: Normocephalic and atraumatic  Eyes: Pupils are equal, round, and reactive to light  Conjunctivae and EOM are normal    Neck: Normal range of motion  Neck supple  No tracheal deviation present  Cardiovascular: Normal rate, regular rhythm, normal heart sounds and intact distal pulses  No murmur heard  Pulmonary/Chest: Effort normal and breath sounds normal  No stridor  No respiratory distress  He has no wheezes  He has no rales  Abdominal: Soft  He exhibits no distension  There is no tenderness  There is no rebound and no guarding  Musculoskeletal: Normal range of motion  He exhibits tenderness  He exhibits no edema or deformity  Patient tender to palpation left low back approximately 8 cm lateral to L3-L5 region   Neurological: He is alert and oriented to person, place, and time  He displays normal reflexes  No cranial nerve deficit or sensory deficit  He exhibits normal muscle tone   Coordination normal    Cranial nerves 2-12 intact, 5/5 strength in all extremities brisk symmetric 2+ reflexes in bilateral lower extremities   Skin: Skin is warm and dry  No rash noted  Psychiatric: He has a normal mood and affect   His behavior is normal  Judgment and thought content normal             Diagnostic Results      Labs:    Results for orders placed or performed during the hospital encounter of 01/13/19   CBC and differential   Result Value Ref Range    WBC 12 66 (H) 4 31 - 10 16 Thousand/uL    RBC 4 72 3 88 - 5 62 Million/uL    Hemoglobin 14 3 12 0 - 17 0 g/dL    Hematocrit 43 1 36 5 - 49 3 %    MCV 91 82 - 98 fL    MCH 30 3 26 8 - 34 3 pg    MCHC 33 2 31 4 - 37 4 g/dL    RDW 13 2 11 6 - 15 1 %    MPV 9 9 8 9 - 12 7 fL    Platelets 407 096 - 637 Thousands/uL    nRBC 0 /100 WBCs    Neutrophils Relative 70 43 - 75 %    Immat GRANS % 0 0 - 2 %    Lymphocytes Relative 16 14 - 44 %    Monocytes Relative 13 (H) 4 - 12 %    Eosinophils Relative 1 0 - 6 %    Basophils Relative 0 0 - 1 %    Neutrophils Absolute 8 83 (H) 1 85 - 7 62 Thousands/µL    Immature Grans Absolute 0 04 0 00 - 0 20 Thousand/uL    Lymphocytes Absolute 1 97 0 60 - 4 47 Thousands/µL    Monocytes Absolute 1 70 (H) 0 17 - 1 22 Thousand/µL    Eosinophils Absolute 0 08 0 00 - 0 61 Thousand/µL    Basophils Absolute 0 04 0 00 - 0 10 Thousands/µL   Basic metabolic panel   Result Value Ref Range    Sodium 141 136 - 145 mmol/L    Potassium 4 0 3 5 - 5 3 mmol/L    Chloride 104 100 - 108 mmol/L    CO2 29 21 - 32 mmol/L    ANION GAP 8 4 - 13 mmol/L    BUN 22 5 - 25 mg/dL    Creatinine 1 27 0 60 - 1 30 mg/dL    Glucose 102 65 - 140 mg/dL    Calcium 8 2 (L) 8 3 - 10 1 mg/dL    eGFR 71 ml/min/1 73sq m   Hepatic function panel   Result Value Ref Range    Total Bilirubin 0 40 0 20 - 1 00 mg/dL    Bilirubin, Direct 0 12 0 00 - 0 20 mg/dL    Alkaline Phosphatase 83 46 - 116 U/L    AST 15 5 - 45 U/L    ALT 19 12 - 78 U/L    Total Protein 6 8 6 4 - 8 2 g/dL    Albumin 3 5 3 5 - 5 0 g/dL   UA w Reflex to Microscopic w Reflex to Culture   Result Value Ref Range    Color, UA Yellow     Clarity, UA Clear     Specific Gravity, UA 1 025 1 003 - 1 030    pH, UA 6 5 4 5 - 8 0    Leukocytes, UA Negative Negative    Nitrite, UA Negative Negative    Protein, UA Trace (A) Negative mg/dl    Glucose, UA Negative Negative mg/dl    Ketones, UA Negative Negative mg/dl    Urobilinogen, UA 0 2 0 2, 1 0 E U /dl E U /dl    Bilirubin, UA Negative Negative    Blood, UA Negative Negative   Urine Microscopic   Result Value Ref Range    RBC, UA 0-1 (A) None Seen, 0-5 /hpf    WBC, UA 1-2 (A) None Seen, 0-5, 5-55, 5-65 /hpf    Epithelial Cells Occasional None Seen, Occasional /hpf    Bacteria, UA Occasional None Seen, Occasional /hpf       All labs reviewed and utilized in the medical decision making process    Radiology:    No orders to display       All radiology studies independently viewed by me and interpreted by the radiologist     Procedure    Procedures    [facility]      ED Course of Care and Re-Assessments      Given Naprosyn and lidocaine for symptomatic treatment  Medications   lidocaine (LIDODERM) 5 % patch 1 patch (1 patch Topical Medication Applied 1/31/19 1128)   naproxen (NAPROSYN) tablet 250 mg (250 mg Oral Given 1/31/19 1127)           FINAL IMPRESSION    Final diagnoses:   Acute left-sided low back pain without sciatica         DISPOSITION/PLAN    Low back pain as above  Vital signs remarkable for mild hypertension setting of acute pain, examination reassuring  No red flags of back pain  Symptoms acute already resolving spontaneously Treated symptomatically and discharged strict return precautions, follow up primary care doctor  Referred ambulatory to [facility] for [facility] care is needed    Time reflects when diagnosis was documented in both MDM as applicable and the Disposition within this note     Time User Action Codes Description Comment    1/31/2019 11:21 AM Barbie MARIANO Add [M54 5] Acute left-sided low back pain without sciatica       ED Disposition     ED Disposition Condition Date/Time Comment    Discharge  Thu Jan 31, 2019 11:21 AM Jeet Hough discharge to home/self care  Condition at discharge: Good        Follow-up Information     Follow up With Specialties Details Why Contact Info    Delia Matos MD Internal Medicine Call in 1 day As needed [address]  [phone]              PATIENT REFERRED TO:    Delia Matos MD  [facility] [address]  [phone]    Call in 1 day  As needed      DISCHARGE MEDICATIONS:    Discharge Medication List as of 1/31/2019 11:23 AM      START taking these medications    Details   naproxen (NAPROSYN) 375 mg tablet Take 1 tablet (375 mg total) by mouth 2 (two) times a day with meals, Starting Thu 1/31/2019, Print         CONTINUE these medications which have NOT CHANGED    Details   hydrocortisone (ANUSOL-HC) 2 5 % rectal cream Insert 1 application into the rectum 2 (two) times a day, Starting Sun 1/13/2019, Print               Outpatient Discharge Orders  Ambulatory Referral to [facility]   Standing Status: Future  Standing Exp   Date: 07/31/19              MD Merle David MD  01/31/19 1157

## 2019-01-31 NOTE — DISCHARGE INSTRUCTIONS
Acute Low Back Pain, Ambulatory Care   GENERAL INFORMATION:   Acute low back pain  is discomfort in your lower back area that lasts for less than 12 weeks  The word acute is used to describe pain that starts suddenly, worsens quickly, and lasts for a short time  Common symptoms include the following:   · Back stiffness or spasms    · Pain down the back or side of one leg    · Holding yourself in an unusual position or posture to decrease your back pain    · Not being able to find a sitting position that is comfortable    · Slow increase in your pain for 24 to 48 hours after you stress your back    · Tenderness on your lower back or severe pain when you move your back  Seek immediate care for the following symptoms:   · Severe pain    · Sudden stiffness and heaviness in both buttocks down to both legs    · Numbness or weakness in one leg, or pain in both legs    · Numbness in your genital area or across your lower back    · Unable to control your urine or bowel movements  Treatment for acute low back pain  may include any of the following:  · Medicines:      ¨ NSAIDs  help decrease swelling and pain or fever  This medicine is available with or without a doctor's order  NSAIDs can cause stomach bleeding or kidney problems in certain people  If you take blood thinner medicine, always ask your healthcare provider if NSAIDs are safe for you  Always read the medicine label and follow directions  ¨ Muscle relaxers  help decrease muscle spasms pain  ¨ Prescription pain medicine  may be given  Ask how to take this medicine safely  · Surgery  may be needed if your pain is severe and other treatments do not work  Surgery may be needed for conditions of the lumbar spine, such as herniated disc or spinal stenosis  Manage your symptoms:   · Sleep on a firm mattress  If you do not have a firm mattress, have someone move your mattress to the floor for a few days   A piece of plywood under your mattress can also help make it firmer  · Apply ice  on your lower back for 15 to 20 minutes every hour or as directed  Use an ice pack, or put crushed ice in a plastic bag  Cover it with a towel  Ice helps prevent tissue damage and decreases swelling and pain  You can alternate ice and heat  · Apply heat  on your lower back for 20 to 30 minutes every 2 hours for as many days as directed  Heat helps decrease pain and muscle spasms  · Go to physical therapy  A physical therapist teaches you exercises to help improve movement and strength, and to decrease pain  Prevent acute low back pain:   · Use proper body mechanics  ¨ Bend at the hips and knees when you  objects  Do not bend from the waist  Use your leg muscles as you lift the load  Do not use your back  Keep the object close to your chest as you lift it  Try not to twist or lift anything above your waist     ¨ Change your position often when you stand for long periods of time  Rest one foot on a small box or footrest, and then switch to the other foot often  ¨ Try not to sit for long periods of time  When you do, sit in a straight-backed chair with your feet flat on the floor  Never reach, pull, or push while you are sitting  · Exercise regularly  Warm up before you exercise  Do exercises that strengthen your back muscles  Ask about the best exercise plan for you  · Maintain a healthy weight  Ask your healthcare provider how much you should weigh  Ask him to help you create a weight loss plan if you are overweight  Follow up with your healthcare provider as directed:  Return for a follow-up visit if you still have pain after 1 to 3 weeks of treatment  You may need to visit an orthopedist if your back pain lasts more than 6 to 12 weeks  Write down your questions so you remember to ask them during your visits  CARE AGREEMENT:   You have the right to help plan your care  Learn about your health condition and how it may be treated   Discuss treatment options with your caregivers to decide what care you want to receive  You always have the right to refuse treatment  The above information is an  only  It is not intended as medical advice for individual conditions or treatments  Talk to your doctor, nurse or pharmacist before following any medical regimen to see if it is safe and effective for you  © 2014 [address] is for End User's use only and may not be sold, redistributed or otherwise used for commercial purposes  All illustrations and images included in CareNotes® are the copyrighted property of A D A M [employer]  or [employer]  Lower Back Exercises   WHAT YOU NEED TO KNOW:   Lower back exercises help heal and strengthen your back muscles to prevent another injury  Ask your healthcare provider if you need to see a physical therapist for more advanced exercises  DISCHARGE INSTRUCTIONS:   Return to the emergency department if:   · You have severe pain that prevents you from moving  Contact your healthcare provider if:   · Your pain becomes worse  · You have new pain  · You have questions or concerns about your condition or care  Do lower back exercises safely:   · Do the exercises on a mat or firm surface  (not on a bed) to support your spine and prevent low back pain  · Move slowly and smoothly  Avoid fast or jerky motions  · Breathe normally  Do not hold your breath  · Stop if you feel pain  It is normal to feel some discomfort at first  Regular exercise will help decrease your discomfort over time  Lower back exercises: Your healthcare provider may recommend that you do back exercises 10 to 30 minutes each day  He may also recommend that you do exercises 1 to 3 times each day  Ask your healthcare provider which exercises are best for you and how often to do them  · Ankle pumps:  Lie on your back  Move your foot up (with your toes pointing toward your head)   Then, move your foot down (with your toes pointing away from you)  Repeat this exercise 10 times on each side  · Heel slides:  Lie on your back  Slowly bend one leg and then straighten it  Next, bend the other leg and then straighten it  Repeat 10 times on each side  · Pelvic tilt:  Lie on your back with your knees bent and feet flat on the floor  Place your arms in a relaxed position beside your body  Tighten the muscles of your abdomen and flatten your back against the floor  Hold for 5 seconds  Repeat 5 times  · Back stretch:  Lie on your back with your hands behind your head  Bend your knees and turn the lower half of your body to one side  Hold this position for 10 seconds  Repeat 3 times on each side  · Straight leg raises:  Lie on your back with one leg straight  Bend the other knee  Tighten your abdomen and then slowly lift the straight leg up about 6 to 12 inches off the floor  Hold for 1 to 5 seconds  Lower your leg slowly  Repeat 10 times on each leg  · Knee-to-chest:  Lie on your back with your knees bent and feet flat on the floor  Pull one of your knees toward your chest and hold it there for 5 seconds  Return your leg to the starting position  Lift the other knee toward your chest and hold for 5 seconds  Do this 5 times on each side  · Cat and camel:  Place your hands and knees on the floor  Arch your back upward toward the ceiling and lower your head  Round out your spine as much as you can  Hold for 5 seconds  Lift your head upward and push your chest downward toward the floor  Hold for 5 seconds  Do 3 sets or as directed  · Wall squats:  Stand with your back against a wall  Tighten the muscles of your abdomen  Slowly lower your body until your knees are bent at a 45 degree angle  Hold this position for 5 seconds  Slowly move back up to a standing position  Repeat 10 times             · Curl up:  Lie on your back with your knees bent and feet flat on the floor  Place your hands, palms down, underneath the curve in your lower back  Next, with your elbows on the floor, lift your shoulders and chest 2 to 3 inches  Keep your head in line with your shoulders  Hold this position for 5 seconds  When you can do this exercise without pain for 10 to 15 seconds, you may add a rotation  While your shoulders and chest are lifted off the ground, turn slightly to the left and hold  Repeat on the other side  · Bird dog:  Place your hands and knees on the floor  Keep your wrists directly below your shoulders and your knees directly below your hips  Pull your belly button in toward your spine  Do not flatten or arch your back  Tighten your abdominal muscles  Raise one arm straight out so that it is aligned with your head  Next, raise the leg opposite your arm  Hold this position for 15 seconds  Lower your arm and leg slowly and change sides  Do 5 sets  © 2017 [facility] Information is for End User's use only and may not be sold, redistributed or otherwise used for commercial purposes  All illustrations and images included in CareNotes® are the copyrighted property of [employer]  or Dae Murillo  The above information is an  only  It is not intended as medical advice for individual conditions or treatments  Talk to your doctor, nurse or pharmacist before following any medical regimen to see if it is safe and effective for you

## 2019-01-31 NOTE — TELEPHONE ENCOUNTER
Pt answered the phone by asking "Did you call here before?"   RN informed Pt  this is the first time reaching out to him from [facility] regarding a referral from the ED  Pt  Then reports "I am going to fill out paperwork for insurance and it's supposed to be retroactive 30 or 60 days and the bill should be taken care of      RN again informed Eric Hinojosa the reason for the call is regarding a referral from the ED Dr Jojo Leo with is a program that offers P T  As a first approach to helping patients heal   RN informed Pt  That most insurances are accepted but the self pay rate is $85-$90     Pt reports:  "The reason I went to the ER was to make sure nothing was seriously wrong and to get a note so I don't lose my job    I am not interested in therapy and if my back doesn't get better, I'll call back the number on my ER paper "

## 2019-06-26 ENCOUNTER — HOSPITAL ENCOUNTER (EMERGENCY)
Facility: HOSPITAL | Age: 39
Discharge: HOME | End: 2019-06-26
Attending: EMERGENCY MEDICINE | Admitting: EMERGENCY MEDICINE
Payer: COMMERCIAL

## 2019-06-26 VITALS
DIASTOLIC BLOOD PRESSURE: 78 MMHG | WEIGHT: 150 LBS | OXYGEN SATURATION: 99 % | HEIGHT: 67 IN | SYSTOLIC BLOOD PRESSURE: 125 MMHG | RESPIRATION RATE: 20 BRPM | TEMPERATURE: 98.4 F | BODY MASS INDEX: 23.54 KG/M2 | HEART RATE: 71 BPM

## 2019-06-26 DIAGNOSIS — Z72.51 HIGH RISK SEXUAL BEHAVIOR: Primary | ICD-10-CM

## 2019-06-26 PROCEDURE — 87591 N.GONORRHOEAE DNA AMP PROB: CPT | Performed by: EMERGENCY MEDICINE

## 2019-06-26 PROCEDURE — 87389 HIV-1 AG W/HIV-1&-2 AB AG IA: CPT | Performed by: EMERGENCY MEDICINE

## 2019-06-26 PROCEDURE — 96372 THER/PROPH/DIAG INJ SC/IM: CPT

## 2019-06-26 PROCEDURE — 99283 EMERGENCY DEPT VISIT LOW MDM: CPT | Performed by: EMERGENCY MEDICINE

## 2019-06-26 PROCEDURE — 87491 CHLMYD TRACH DNA AMP PROBE: CPT | Performed by: EMERGENCY MEDICINE

## 2019-06-26 PROCEDURE — 36415 COLL VENOUS BLD VENIPUNCTURE: CPT | Performed by: EMERGENCY MEDICINE

## 2019-06-26 PROCEDURE — 99283 EMERGENCY DEPT VISIT LOW MDM: CPT

## 2019-06-26 RX ORDER — AZITHROMYCIN 250 MG/1
1000 TABLET, FILM COATED ORAL ONCE
Status: COMPLETED | OUTPATIENT
Start: 2019-06-26 | End: 2019-06-26

## 2019-06-26 RX ORDER — ONDANSETRON 4 MG/1
4 TABLET, ORALLY DISINTEGRATING ORAL ONCE
Status: COMPLETED | OUTPATIENT
Start: 2019-06-26 | End: 2019-06-26

## 2019-06-26 RX ADMIN — LIDOCAINE HYDROCHLORIDE 250 MG: 10 INJECTION, SOLUTION EPIDURAL; INFILTRATION; INTRACAUDAL; PERINEURAL at 17:05

## 2019-06-26 RX ADMIN — AZITHROMYCIN 1000 MG: 250 TABLET, FILM COATED ORAL at 17:06

## 2019-06-26 RX ADMIN — ONDANSETRON 4 MG: 4 TABLET, ORALLY DISINTEGRATING ORAL at 17:06

## 2019-06-26 NOTE — ED PROVIDER NOTES
History  Chief Complaint   Patient presents with    Exposure to STD     pt states that he has had a ot of sex unprotected on dating sites, says he felt like he was invinsible and now doesnt feel right and thinks he has the clap, has a sore throat and dos not feel right      44 yo M presenting with request for STD check  Pt reports having unprotected intercourse with numerous people and is concerned for STD  No history of prior STDs  No symptoms  Requesting HIV testing as well  No symptoms  Prior to Admission Medications   Prescriptions Last Dose Informant Patient Reported? Taking?   hydrocortisone (ANUSOL-HC) 2 5 % rectal cream   No No   Sig: Insert 1 application into the rectum 2 (two) times a day   naproxen (NAPROSYN) 375 mg tablet   No No   Sig: Take 1 tablet (375 mg total) by mouth 2 (two) times a day with meals      Facility-Administered Medications: None       Past Medical History:   Diagnosis Date    Psychiatric disorder     Schizoaffective disorder ([facility] [address] )        Past Surgical History:   Procedure Laterality Date    ANUS SURGERY         History reviewed  No pertinent family history  I have reviewed and agree with the history as documented  Social History     Tobacco Use    Smoking status: Current Every Day Smoker     Types: Cigarettes    Smokeless tobacco: Current User   Substance Use Topics    Alcohol use: No    Drug use: No        Review of Systems   Constitutional: Negative for chills and fever  HENT: Negative for rhinorrhea and sore throat  Respiratory: Negative for cough and shortness of breath  Cardiovascular: Negative for chest pain and leg swelling  Gastrointestinal: Negative for abdominal pain, nausea and vomiting  Genitourinary: Negative for discharge, dysuria, penile pain, penile swelling and urgency  Musculoskeletal: Negative for back pain and neck pain  Skin: Negative for color change and rash  Allergic/Immunologic: Negative for immunocompromised state  Neurological: Negative for dizziness and headaches  All other systems reviewed and are negative  Physical Exam  Physical Exam   Constitutional: He is oriented to person, place, and time  He appears well-developed and well-nourished  HENT:   Head: Normocephalic and atraumatic  Cardiovascular: Normal rate and regular rhythm  Pulmonary/Chest: Effort normal and breath sounds normal    Abdominal: Soft  He exhibits no distension  Musculoskeletal: He exhibits no edema or deformity  Neurological: He is alert and oriented to person, place, and time  He exhibits normal muscle tone  Coordination normal    Skin: Skin is warm and dry  No rash noted  Psychiatric: He has a normal mood and affect  His behavior is normal    Nursing note and vitals reviewed        Vital Signs  ED Triage Vitals [06/26/19 1548]   Temperature Pulse Respirations Blood Pressure SpO2   98 4 °F (36 9 °C) 71 20 125/78 99 %      Temp Source Heart Rate Source Patient Position - Orthostatic VS BP Location FiO2 (%)   Oral Monitor Sitting Left arm --      Pain Score       --           Vitals:    06/26/19 1548   BP: 125/78   Pulse: 71   Patient Position - Orthostatic VS: Sitting         Visual Acuity      ED Medications  Medications   cefTRIAXone (ROCEPHIN) 250 mg in lidocaine (PF) (XYLOCAINE-MPF) 1 % IM only syringe (has no administration in time range)   azithromycin (ZITHROMAX) tablet 1,000 mg (has no administration in time range)   ondansetron (ZOFRAN-ODT) dispersible tablet 4 mg (has no administration in time range)       Diagnostic Studies  Results Reviewed     Procedure Component Value Units Date/Time    HIV 1/2 AG-AB combo [[id number]]     Lab Status:  No result Specimen:  Blood     Chlamydia/GC amplified DNA by PCR [[id number]]     Lab Status:  No result Specimen:  Urine, Other                  No orders to display              Procedures  Procedures       ED Course                               MDM  Number of Diagnoses or Management Options  High risk sexual behavior:   Diagnosis management comments: 42 yo M with high risk sexual behavior, treating prophylactically for GC/Chlamydia  HIV testing sent out  Amount and/or Complexity of Data Reviewed  Clinical lab tests: ordered  Tests in the medicine section of CPT®: ordered        Disposition  Final diagnoses:   High risk sexual behavior     Time reflects when diagnosis was documented in both MDM as applicable and the Disposition within this note     Time User Action Codes Description Comment    6/26/2019  4:40 PM Tricia NAVAS Add [Z20 2] STD exposure     6/26/2019  4:40 PM Tricia NAVAS Remove [Z20 2] STD exposure     6/26/2019  4:40 PM Christal Botlelo Add [Z72 51] High risk sexual behavior       ED Disposition     ED Disposition Condition Date/Time Comment    Discharge Stable Wed Jun 26, 2019  4:40 PM Joseph Jha discharge to home/self care  Follow-up Information     Follow up With Specialties Details Why Contact Info    Chandu Chua MD Internal Medicine   [address] [place] [address]  [phone]            Patient's Medications   Discharge Prescriptions    No medications on file     No discharge procedures on file      ED Provider  Electronically Signed by           Brielle Muhammad DO  06/26/19 0762

## 2019-06-27 LAB
C TRACH DNA SPEC QL NAA+PROBE: NEGATIVE
N GONORRHOEA DNA SPEC QL NAA+PROBE: NEGATIVE

## 2019-06-28 LAB — HIV 1+2 AB+HIV1 P24 AG SERPL QL IA: NORMAL

## 2022-11-26 ENCOUNTER — HOSPITAL ENCOUNTER (EMERGENCY)
Facility: HOSPITAL | Age: 42
Discharge: HOME | End: 2022-11-26
Attending: EMERGENCY MEDICINE

## 2022-11-26 ENCOUNTER — APPOINTMENT (EMERGENCY)
Dept: CT IMAGING | Facility: HOSPITAL | Age: 42
End: 2022-11-26

## 2022-11-26 VITALS
SYSTOLIC BLOOD PRESSURE: 131 MMHG | DIASTOLIC BLOOD PRESSURE: 79 MMHG | OXYGEN SATURATION: 96 % | HEART RATE: 84 BPM | TEMPERATURE: 97.5 F | RESPIRATION RATE: 23 BRPM

## 2022-11-26 DIAGNOSIS — G40.919 BREAKTHROUGH SEIZURE (HCC): Primary | ICD-10-CM

## 2022-11-26 LAB
ALBUMIN SERPL BCP-MCNC: 3.7 G/DL (ref 3.5–5)
ALP SERPL-CCNC: 59 U/L (ref 46–116)
ALT SERPL W P-5'-P-CCNC: 30 U/L (ref 12–78)
ANION GAP SERPL CALCULATED.3IONS-SCNC: 10 MMOL/L (ref 4–13)
APAP SERPL-MCNC: <2 UG/ML (ref 10–20)
AST SERPL W P-5'-P-CCNC: 16 U/L (ref 5–45)
BASOPHILS # BLD AUTO: 0.05 THOUSANDS/ÂΜL (ref 0–0.1)
BASOPHILS NFR BLD AUTO: 1 % (ref 0–1)
BILIRUB DIRECT SERPL-MCNC: 0.13 MG/DL (ref 0–0.2)
BILIRUB SERPL-MCNC: 0.41 MG/DL (ref 0.2–1)
BUN SERPL-MCNC: 13 MG/DL (ref 5–25)
CALCIUM SERPL-MCNC: 8.2 MG/DL (ref 8.3–10.1)
CHLORIDE SERPL-SCNC: 104 MMOL/L (ref 96–108)
CO2 SERPL-SCNC: 26 MMOL/L (ref 21–32)
CREAT SERPL-MCNC: 1.15 MG/DL (ref 0.6–1.3)
EOSINOPHIL # BLD AUTO: 0.06 THOUSAND/ÂΜL (ref 0–0.61)
EOSINOPHIL NFR BLD AUTO: 1 % (ref 0–6)
ERYTHROCYTE [DISTWIDTH] IN BLOOD BY AUTOMATED COUNT: 13 % (ref 11.6–15.1)
ETHANOL SERPL-MCNC: <3 MG/DL (ref 0–3)
GFR SERPL CREATININE-BSD FRML MDRD: 77 ML/MIN/1.73SQ M
GLUCOSE SERPL-MCNC: 79 MG/DL (ref 65–140)
HCT VFR BLD AUTO: 42.4 % (ref 36.5–49.3)
HGB BLD-MCNC: 14 G/DL (ref 12–17)
IMM GRANULOCYTES # BLD AUTO: 0.01 THOUSAND/UL (ref 0–0.2)
IMM GRANULOCYTES NFR BLD AUTO: 0 % (ref 0–2)
INR PPP: 0.97 (ref 0.84–1.19)
LYMPHOCYTES # BLD AUTO: 2.38 THOUSANDS/ÂΜL (ref 0.6–4.47)
LYMPHOCYTES NFR BLD AUTO: 33 % (ref 14–44)
MCH RBC QN AUTO: 30.5 PG (ref 26.8–34.3)
MCHC RBC AUTO-ENTMCNC: 33 G/DL (ref 31.4–37.4)
MCV RBC AUTO: 92 FL (ref 82–98)
MONOCYTES # BLD AUTO: 1.06 THOUSAND/ÂΜL (ref 0.17–1.22)
MONOCYTES NFR BLD AUTO: 15 % (ref 4–12)
NEUTROPHILS # BLD AUTO: 3.58 THOUSANDS/ÂΜL (ref 1.85–7.62)
NEUTS SEG NFR BLD AUTO: 50 % (ref 43–75)
NRBC BLD AUTO-RTO: 0 /100 WBCS
PLATELET # BLD AUTO: 223 THOUSANDS/UL (ref 149–390)
PMV BLD AUTO: 9.7 FL (ref 8.9–12.7)
POTASSIUM SERPL-SCNC: 3.7 MMOL/L (ref 3.5–5.3)
PROT SERPL-MCNC: 6.6 G/DL (ref 6.4–8.4)
PROTHROMBIN TIME: 12.7 SECONDS (ref 11.6–14.5)
RBC # BLD AUTO: 4.59 MILLION/UL (ref 3.88–5.62)
SALICYLATES SERPL-MCNC: 3.7 MG/DL (ref 3–20)
SODIUM SERPL-SCNC: 140 MMOL/L (ref 135–147)
WBC # BLD AUTO: 7.14 THOUSAND/UL (ref 4.31–10.16)

## 2022-11-26 RX ADMIN — SODIUM CHLORIDE 1000 ML: 0.9 INJECTION, SOLUTION INTRAVENOUS at 15:21

## 2022-11-26 NOTE — DISCHARGE INSTRUCTIONS
Presentation was consistent with a breakthrough seizure  Call your neurologist and discuss the need to start medications     [facility] [address]    [place], [place] [address]    [address] [place] [address] Matias Monteiro MD    [address] [place] [address] [address]el [address]    [phone] (Work)    [phone] (Fax)   Seizure [facility]) (Primary Dx)

## 2022-11-26 NOTE — ED PROVIDER NOTES
History  Chief Complaint   Patient presents with   • Seizure - Prior Hx Of     Seizure like activity at home for about 10 minutes, abrasion on head  HPI patient is a 44-year-old male, history of a seizure in June, apparently had an EEG at that time which was nondiagnostic apparently advised to have a 24 hour ambulatory EEG  An MRI at that time that was normal   Presents emergency department today apparently the medics report that he had drank some Kratom, which is a plant based product the patient reports he drinks it to calmed himself down  Apparently today he had then a tonic-clonic grand mal seizure at home and was postictal when the patient arrived  Denies any focal weakness  Rides in a cervical collar but has no neck pain  There is a small abrasion on the patient's forehead  He apparently did fall in his head, there are some abrasions along left side of his tongue consistent with a seizure  Patient does not have any recollection of the episode  Past medical history of admitted to [facility] in June for a potential seizure at work, EEG at that time was nonspecific, recommended 24 hour EEG  Patient had an MRI which was normal   Patient is followed by Neurology there  Past medical history of seizure, schizoaffective disorder  Family history noncontributory  Social history smoker, again reports that he uses Kratom        Prior to Admission Medications   Prescriptions Last Dose Informant Patient Reported?  Taking?   hydrocortisone (ANUSOL-HC) 2 5 % rectal cream   No No   Sig: Insert 1 application into the rectum 2 (two) times a day   naproxen (NAPROSYN) 375 mg tablet   No No   Sig: Take 1 tablet (375 mg total) by mouth 2 (two) times a day with meals      Facility-Administered Medications: None       Past Medical History:   Diagnosis Date   • Psychiatric disorder    • Schizoaffective disorder [facility])        Past Surgical History:   Procedure Laterality Date   • ANUS SURGERY         History reviewed  No pertinent family history  I have reviewed and agree with the history as documented  E-Cigarette/Vaping     E-Cigarette/Vaping Substances     Social History     Tobacco Use   • Smoking status: Every Day     Types: Cigarettes   • Smokeless tobacco: Current   Substance Use Topics   • Alcohol use: No   • Drug use: No       Review of Systems   Constitutional: Negative for diaphoresis, fatigue and fever  HENT: Negative for congestion, ear pain, nosebleeds and sore throat  Eyes: Negative for photophobia, pain, discharge and visual disturbance  Respiratory: Negative for cough, choking, chest tightness, shortness of breath and wheezing  Cardiovascular: Negative for chest pain and palpitations  Gastrointestinal: Negative for abdominal distention, abdominal pain, diarrhea and vomiting  Genitourinary: Negative for dysuria, flank pain and frequency  Musculoskeletal: Negative for back pain, gait problem and joint swelling  Skin: Negative for color change and rash  Neurological: Positive for seizures  Negative for dizziness, syncope and headaches  Psychiatric/Behavioral: Negative for behavioral problems and confusion  The patient is not nervous/anxious  All other systems reviewed and are negative  Physical Exam  Physical Exam  Vitals and nursing note reviewed  Constitutional:       Appearance: He is well-developed and well-nourished  HENT:      Head: Normocephalic  Comments: There is a small bruise on the anterior forehead consistent with hitting his head on the ground  Right Ear: External ear normal       Left Ear: External ear normal       Nose: Nose normal       Mouth/Throat:      Mouth: Oropharynx is clear and moist  Mucous membranes are moist       Comments: There are some small bruises along the left-sided tongue consistent with biting his tongue    Eyes:      General: Lids are normal       Extraocular Movements: EOM normal       Pupils: Pupils are equal, round, and reactive to light  Cardiovascular:      Rate and Rhythm: Normal rate and regular rhythm  Pulses: Normal pulses  Heart sounds: Normal heart sounds  Pulmonary:      Effort: Pulmonary effort is normal  No respiratory distress  Breath sounds: Normal breath sounds  Abdominal:      General: Abdomen is flat  Bowel sounds are normal       Tenderness: There is no abdominal tenderness  Musculoskeletal:         General: No deformity  Normal range of motion  Cervical back: Normal range of motion and neck supple  Skin:     General: Skin is warm and dry  Neurological:      General: No focal deficit present  Mental Status: He is alert and oriented to person, place, and time  Mental status is at baseline  Cranial Nerves: No cranial nerve deficit        Comments: Patient ambulated normally   Psychiatric:         Mood and Affect: Mood and affect normal          Vital Signs  ED Triage Vitals   Temperature Pulse Respirations Blood Pressure SpO2   11/26/22 1437 11/26/22 1437 11/26/22 1437 11/26/22 1437 11/26/22 1437   97 5 °F (36 4 °C) 90 17 131/82 99 %      Temp Source Heart Rate Source Patient Position - Orthostatic VS BP Location FiO2 (%)   11/26/22 1437 11/26/22 1521 11/26/22 1437 11/26/22 1437 --   Oral Monitor Lying Right arm       Pain Score       --                  Vitals:    11/26/22 1437 11/26/22 1521 11/26/22 1600   BP: 131/82 126/80 131/79   Pulse: 90 80 84   Patient Position - Orthostatic VS: Lying Lying          Visual Acuity      ED Medications  Medications   sodium chloride 0 9 % bolus 1,000 mL (0 mL Intravenous Stopped 11/26/22 1625)       Diagnostic Studies  Results Reviewed     Procedure Component Value Units Date/Time    Ethanol [[id number]]  (Normal) Collected: 11/26/22 1449    Lab Status: Final result Specimen: Blood from Arm, Left Updated: 11/26/22 1537     Ethanol Lvl <3 mg/dL     Acetaminophen level-"If concentration is detectable, please discuss with medical  on call " [[id number]]  (Abnormal) Collected: 11/26/22 1448    Lab Status: Final result Specimen: Blood from Arm, Left Updated: 11/26/22 1520     Acetaminophen Level <3 2 ug/mL     Salicylate level [[id number]]  (Normal) Collected: 11/26/22 1448    Lab Status: Final result Specimen: Blood from Arm, Left Updated: 41/64/49 2909     Salicylate Lvl 3 7 mg/dL     Basic metabolic panel [[id number]]  (Abnormal) Collected: 11/26/22 1447    Lab Status: Final result Specimen: Blood from Arm, Left Updated: 11/26/22 1517     Sodium 140 mmol/L      Potassium 3 7 mmol/L      Chloride 104 mmol/L      CO2 26 mmol/L      ANION GAP 10 mmol/L      BUN 13 mg/dL      Creatinine 1 15 mg/dL      Glucose 79 mg/dL      Calcium 8 2 mg/dL      eGFR 77 ml/min/1 73sq m     Narrative:      [place] guidelines for Chronic Kidney Disease (CKD):   •  Stage 1 with normal or high GFR (GFR > 90 mL/min/1 73 square meters)  •  Stage 2 Mild CKD (GFR = 60-89 mL/min/1 73 square meters)  •  Stage 3A Moderate CKD (GFR = 45-59 mL/min/1 73 square meters)  •  Stage 3B Moderate CKD (GFR = 30-44 mL/min/1 73 square meters)  •  Stage 4 Severe CKD (GFR = 15-29 mL/min/1 73 square meters)  •  Stage 5 End Stage CKD (GFR <15 mL/min/1 73 square meters)  Note: GFR calculation is accurate only with a steady state creatinine    Hepatic function panel [[id number]]  (Normal) Collected: 11/26/22 1447    Lab Status: Final result Specimen: Blood from Arm, Left Updated: 11/26/22 1517     Total Bilirubin 0 41 mg/dL      Bilirubin, Direct 0 13 mg/dL      Alkaline Phosphatase 59 U/L      AST 16 U/L      ALT 30 U/L      Total Protein 6 6 g/dL      Albumin 3 7 g/dL     Protime-INR [[id number]]  (Normal) Collected: 11/26/22 1447    Lab Status: Final result Specimen: Blood from Arm, Left Updated: 11/26/22 1513     Protime 12 7 seconds      INR 0 97    CBC and differential [[id number]]  (Abnormal) Collected: 11/26/22 1447    Lab Status: Final result Specimen: Blood from Arm, Left Updated: 11/26/22 1500     WBC 7 14 Thousand/uL      RBC 4 59 Million/uL      Hemoglobin 14 0 g/dL      Hematocrit 42 4 %      MCV 92 fL      MCH 30 5 pg      MCHC 33 0 g/dL      RDW 13 0 %      MPV 9 7 fL      Platelets 408 Thousands/uL      nRBC 0 /100 WBCs      Neutrophils Relative 50 %      Immat GRANS % 0 %      Lymphocytes Relative 33 %      Monocytes Relative 15 %      Eosinophils Relative 1 %      Basophils Relative 1 %      Neutrophils Absolute 3 58 Thousands/µL      Immature Grans Absolute 0 01 Thousand/uL      Lymphocytes Absolute 2 38 Thousands/µL      Monocytes Absolute 1 06 Thousand/µL      Eosinophils Absolute 0 06 Thousand/µL      Basophils Absolute 0 05 Thousands/µL                  CT head without contrast   Final Result by Teto Carter MD (11/26 1515)      No acute intracranial abnormality  Workstation performed: [id number]         CT spine cervical without contrast   Final Result by Teto Carter MD (11/26 0161)      No cervical spine fracture or traumatic malalignment  Small thyroid nodules                     Workstation performed: [id number]                    Procedures  ECG 12 Lead Documentation Only    Date/Time: 11/26/2022 9:28 PM  Performed by: Natalie Trujillo MD  Authorized by: Natalie Trujillo MD     Indications / Diagnosis:  Weird feeling  ECG reviewed by me, the ED Provider: yes    Patient location:  ED  Previous ECG:     Previous ECG:  Compared to current    Comparison ECG info:  September 13, 2017    Similarity:  Changes noted  Interpretation:     Interpretation: non-specific    Rate:     ECG rate:  Eighty-seven    ECG rate assessment: normal    Comments:      Normal sinus rhythm right bundle josh block no acute ST-T wave changes right bundle branch block is new from prior EKG otherwise normal              ED Course            diagnostic testing:  CT scan of brain was normal  CT scan of cervical spine was normal   Tylenol and aspirin levels were normal   Electrolytes were within normal limits  Liver functions were normal     white count was normal at 7 1 no sign of inflammation hemoglobin was normal at 14 no sign of anemia  I spoke with Toxicology, discussed patient's use of Kratom, apparently in large doses this is a sedative, and smaller doses a can be an accelerant but unlikely because the patient seizures  Toxicology felt this was most likely a breakthrough seizure which I agree  [facility] medical decision making 59-year-old male presents emergency department with a seizure home, history of a seizure in June, followed at [facility], [facility]  Apparently had an EEG with them and also an MRI  Apparently was supposed to follow-up for ambulatory EEG  Discussed with patient, this appears to be a breakthrough seizure he agrees  We discussed follow-up with his neurologist we discussed possibly starting medication  We discussed indications to return  Disposition  Final diagnoses:   Breakthrough seizure ([address] )     Time reflects when diagnosis was documented in both MDM as applicable and the Disposition within this note     Time User Action Codes Description Comment    11/26/2022  4:25 PM Peace Zheng Add [G40 919] Breakthrough seizure [facility])       ED Disposition     ED Disposition   Discharge    Condition   Stable    Date/Time   Sat Nov 26, 2022  4:24 PM    Comment   Uvaldo Lies discharge to home/self care  Follow-up Information    None         Discharge Medication List as of 11/26/2022  4:26 PM      CONTINUE these medications which have NOT CHANGED    Details   hydrocortisone (ANUSOL-HC) 2 5 % rectal cream Insert 1 application into the rectum 2 (two) times a day, Starting Sun 1/13/2019, Print      naproxen (NAPROSYN) 375 mg tablet Take 1 tablet (375 mg total) by mouth 2 (two) times a day with meals, Starting Thu 1/31/2019, Print             No discharge procedures on file      PDMP Review None          ED Provider  Electronically Signed by           Oseas Izquierdo MD  11/26/22 6507

## 2022-11-27 LAB
ATRIAL RATE: 87 BPM
P AXIS: 60 DEGREES
PR INTERVAL: 184 MS
QRS AXIS: 94 DEGREES
QRSD INTERVAL: 124 MS
QT INTERVAL: 408 MS
QTC INTERVAL: 490 MS
T WAVE AXIS: 46 DEGREES
VENTRICULAR RATE: 87 BPM